# Patient Record
Sex: FEMALE | Race: BLACK OR AFRICAN AMERICAN | NOT HISPANIC OR LATINO | URBAN - METROPOLITAN AREA
[De-identification: names, ages, dates, MRNs, and addresses within clinical notes are randomized per-mention and may not be internally consistent; named-entity substitution may affect disease eponyms.]

---
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
## 2021-09-09 NOTE — ED ADULT NURSE NOTE - EXTENSIONS OF SELF_ADULT
FAMILY HISTORY:  Mother  Still living? Unknown  FH: HIV infection, Age at diagnosis: Age Unknown     None

## 2022-03-23 NOTE — ED PROVIDER NOTE - CROS ED PSYCH ALL NEG
Please inform patient of stable labs except for mildly elevated potassium. This should not be secondary to any of our medications, however I would also send to PCP.     Thanks,  Rozetta Lanes, PA-C  3/23/2022 - - -

## 2022-05-24 NOTE — ED PROVIDER NOTE - CROS ED SKIN ALL NEG
Impression: S/P Cataract Extraction by phacoemulsification with IOL placement; Lensx, ORA OS - 1 Day. Encounter for surgical aftercare following surgery on a sense organ  Z48.810. Excellent post op course Plan: Delphine. rtc as scheduled. negative...

## 2022-08-29 NOTE — ED ADULT NURSE NOTE - NS ED NOTE  TALK SOMEONE YN
Returned call. Rescheduled appointment    ----- Message from Clau Garsia sent at 8/29/2022  9:42 AM CDT -----  Contact: Patient  Type:  Sooner Apoointment Request      Name of Caller: Patient       Would the patient rather a call back or a response via [facility]? Call     Best Call Back Number: [phone] (home)      Additional Information: Needs to schedule pre-op appointment            no